# Patient Record
Sex: MALE | Race: WHITE
[De-identification: names, ages, dates, MRNs, and addresses within clinical notes are randomized per-mention and may not be internally consistent; named-entity substitution may affect disease eponyms.]

---

## 2019-03-11 ENCOUNTER — HOSPITAL ENCOUNTER (EMERGENCY)
Dept: HOSPITAL 88 - FSED | Age: 69
Discharge: HOME | End: 2019-03-11
Payer: MEDICARE

## 2019-03-11 VITALS — WEIGHT: 232 LBS | HEIGHT: 75 IN | BODY MASS INDEX: 28.85 KG/M2

## 2019-03-11 VITALS — DIASTOLIC BLOOD PRESSURE: 97 MMHG | SYSTOLIC BLOOD PRESSURE: 149 MMHG

## 2019-03-11 DIAGNOSIS — R51: ICD-10-CM

## 2019-03-11 DIAGNOSIS — R19.7: ICD-10-CM

## 2019-03-11 DIAGNOSIS — R05: Primary | ICD-10-CM

## 2019-03-11 DIAGNOSIS — K21.9: ICD-10-CM

## 2019-03-11 DIAGNOSIS — J00: ICD-10-CM

## 2019-03-11 DIAGNOSIS — R11.2: ICD-10-CM

## 2019-03-11 PROCEDURE — 99283 EMERGENCY DEPT VISIT LOW MDM: CPT

## 2019-03-11 PROCEDURE — 87400 INFLUENZA A/B EACH AG IA: CPT

## 2019-03-11 PROCEDURE — 83518 IMMUNOASSAY DIPSTICK: CPT

## 2019-03-11 NOTE — XMS REPORT
Patient Summary Document

                             Created on: 2019



DAILEY, TERRY DUANE

External Reference #: [id number]

: 1950

Sex: Male



Demographics







                          Address                   .

[place]  [address]

 

                          Home Phone                [phone]

 

                          Preferred Language        Unknown

 

                          Marital Status            Unknown

 

                          Restorationist Affiliation     Unknown

 

                          Race                      Unknown

 

                          Ethnic Group              Unknown





Author







                          Author                    [facility]

 

                          Address                   Unknown

 

                          Phone                     Unavailable







Care Team Providers







                    Care Team Member Name    Role                Phone

 

                    ASHLEY CUELLO    Unavailable         Unavailable







Problems

This patient has no known problems.



Allergies, Adverse Reactions, Alerts

This patient has no known allergies or adverse reactions.



Medications

This patient has no known medications.



Results







           Test Description    Test Time    Test Comments    Text Results    Atomic Results    Result

 Comments

 

                TISSUE EXAM     2017 13:22:00                    Surgical Pathology Report                  

       Case: [id number]                                 Authorizing Provider:  
Leigh Cuello,   Collected:           08/10/2017 0748                 
                 MD                                                             
           Ordering Location:     [facility]         Received:          
 08/10/2017 0858                                   SERVICES                     
                                             Pathologist:           Daniel Waller MD                                                   Specimen:    
Femoral Head,Left Hip                                                           
    BONE AND SOFT TISSUE, LEFT HIP, ARTHROPLASTY:      -OSTEOARTHRITIS AND 
CHRONIC PROLIFERATIVE SYNOVITIS      Signing Pathologist Direct Phone Line: 
[phone] signed by Daniel Waller MD on 2017 at  
1:22 [id number] osteoarthritis left hipLeft femoral headReceived fresh 
labeled "femoral head, left hip" is a 7.5 x 6.0 x 3.5 cm femoral head. The 
articular surface is gray-white to yellow-tan and displays focal areas of 
pitting and eburnation. Also noted is osteophyte formation surrounding the 
periphery. Section code: A1-A2, bone for decalcification; A3, soft tissue for 
decalcification. DB/pl The sections show reduplication of the tidemark with 
eburnation and osteophyte formation.  The synovial tissue reveals a mild 
proliferation of small blood vessels and subsynovial edema with chronic 
inflammation.                            

 

                BASIC METABOLIC PANEL    2017 04:30:00                      

 

   

 

                SODIUM (BEAKER) (test code=381)    133 meq/L       136-145          

 

                POTASSIUM (BEAKER) (test code=379)    4.6 meq/L       3.5-5.1          

 

                CHLORIDE (BEAKER) (test code=382)    106 meq/L                  

 

                CO2 (BEAKER) (test code=355)    17 meq/L        22-29            

 

                BLOOD UREA NITROGEN (BEAKER) (test code=354)    21 mg/dL        7-21             

 

                CREATININE (BEAKER) (test code=358)    0.87 mg/dL      0.57-1.25        

 

                GLUCOSE RANDOM (BEAKER) (test code=652)    228 mg/dL                  

 

                CALCIUM (BEAKER) (test code=697)    8.2 mg/dL       8.4-10.2         

 

                EGFR (BEAKER) (test code=1092)    88 mL/min/1.73 sq m                    ESTIMATED GFR IS NOT AS 

ACCURATE AS CREATININE CLEARANCE IN PREDICTING GLOMERULAR FILTRATION RATE. 
ESTIMATED GFR IS NOT APPLICABLE FOR DIALYSIS PATIENTS.





HEMOGLOBIN AND [id number] 04:16:00* 





                Test Item       Value           Reference Range    Comments

 

                HEMOGLOBIN (BEAKER) (test code=410)    12.8 GM/DL      13.7-17.5        

 

                HEMATOCRIT (BEAKER) (test code=411)    37.6 %          40.1-51.0        





[id number] 12:13:00* 





                Test Item       Value           Reference Range    Comments

 

                HEMOGLOBIN (BEAKER) (test code=410)    16.3 GM/DL      13.0-16.8        





PLATELET [id number] 12:13:00* 





                Test Item       Value           Reference Range    Comments

 

                PLATELET COUNT (BEAKER) (test code=756)    283 K/CU MM     150-430

## 2019-03-11 NOTE — XMS REPORT
Clinical Summary

                             Created on: 2019



Dailey, Terry DUANE

External Reference #: [id number]

: 1950

Sex: Male



Demographics







                          Address                   [address] [place] 

[place]  [address]

 

                          Home Phone                [phone]

 

                          Preferred Language        English

 

                          Marital Status            Unknown

 

                          Confucianism Affiliation     Restorationism

 

                          Race                      White

 

                          Ethnic Group              Non-





Author







                          Author                    [facility]

 

                          Organization              [facility]

 

                          Address                   Unknown

 

                          Phone                     Unavailable







Support







                Name            Relationship    Address         Phone

 

                    Samina Faith        [employer]                [address] [place]  [address]                 [phone]







Care Team Providers







                    Care Team Member Name    Role                Phone

 

                    Miguelina Martinez MD       PCP                 [phone]







Allergies







                                        Comments



                 Active Allergy     Reactions       Severity        Noted Date 

 

                                         



                     Hydromorphone       Hives               2016 

 

                                        



migraines



                     Morphine Hcl        Other (See          2016 



                                         Comments)   







Medications







                          End Date                  Status



              Medication     Sig          Dispensed     Refills      Start  



                                         Date  

 

                                                    Active



                     esomeprazole (NEXIUM) 40     Take 40 mg by       0   



                           MG capsule                mouth daily.     

 

                                                    Active



                     diphenhydrAMINE     Take 25 mg by       0   



                           (BENADRYL) 25 mg capsule     mouth every 6     



                                         (six) hours     



                                         as needed for     



                                         Itching.     

 

                                                    Active



                     cetirizine (ZYRTEC) 10 MG     Take 10 mg by       0   



                           tablet                    mouth daily.     







Active Problems







 



                           Problem                   Noted Date

 

 



                           Status post left hip replacement     08/10/2017







Social History







                                        Date



                 Tobacco Use     Types           Packs/Day       Years Used 

 

                                         



                                         Never Smoker    

 

    



                                         Smokeless Tobacco: Never   



                                         Used   









   



                 Alcohol Use     Drinks/Week     oz/Week         Comments

 

   



                                         No   









 



                           Sex Assigned at Birth     Date Recorded

 

 



                                         Not on file 









                                        Industry



                           Job Start Date            Occupation 

 

                                        Not on file



                           Not on file               Not on file 









                                        Travel End



                           Travel History            Travel Start 

 





                                         No recent travel history available.







Last Filed Vital Signs

Not on file



Plan of Treatment





Not on file



Implants







                    Device Identifier    Shelf Expiration Date    Model / Serial / Lot



                 Implanted       Type            Area            Manufactur   



                                         er   

 

                                        2022          502-03-58F /

 /

                                        [id number]



                 Shell Ramonita Hmsphr Clus H 58mm     Joints          Left: Hip       LENORA:ST   



                           502-03-58f - [id number]       [place]   



                           Implanted: Qty: 1 on 08/10/2017 by       Leigh Kulkarni MD       CS   

 

                                        2022          623-00-36F /

 /

                                        [id number]



                 Insrt Trident X3 0deg F 36mm     Joints          Left: Hip       LENORA:ST   



                           623-00-36f - [id number]       [place]   



                           Implanted: Qty: 1 on 08/10/2017 by       Leigh Kulkarni MD          

 

                                        2022          1693-4772-1 /

 /

[id number]



                 Scr Canc 6.5x25mm Ss 0473-1475-1 -     Joints          Left: Hip       [employer]ORA:ST   



                           [id number]                 [place]   



                           Implanted: Qty: 1 on 08/10/2017 by       Leigh Kulkarni MD          

 

                                        2022          4776-6162-1 /

 /

[id number]



                 Scr Canc 6.5x25mm Ss 3894-6294-1 -     Joints          Left: Hip       [employer]ORA:ST   



                           [id number]                 [place]   



                           Implanted: Qty: 1 on 08/10/2017 by       Leigh Kulkarni MD          

 

                                        10/23/2021          0025-1835 /

 /

                                        [id number]



                 Hip Stem Size 7 37mm X 114mm     Joints          Left: Hip       [employer]ORA:ST   



                           8100-6487 - [id number]       [place]   



                           Implanted: Qty: 1 on 08/10/2017 by       Leigh Kulkarni MD          

 

                                        2021          6570-0-236 /

 /

                                        [id number]



                 Head Fem Biolox V40 36mm 6570-0-236     Joints          Left: Hip       LENORA:ST   



                           - [id number]               [place]   



                           Implanted: Qty: 1 on 08/10/2017 by       Leigh Kulkarni MD          







Results

Not on fileafter 03/10/2018



Insurance







     



            Payer      Benefit     Subscriber ID     Type       Phone      Address



                                         Plan /    



                                         Group    

 

     



                 MEDICARE        MEDICARE A      xxxxxxxxxx      Medicare  



                                         B    

 

     



                 MCR SUPPLEMENT/INDIVIDUAL     [employer]      xxxxxxxxxxx     [employer]  



                                         D    



                                         HEALTHCARE    









     



            Guarantor Name     Account     Relation to     Date of     Phone      Billing Address



                     Type                Patient             Birth  

 

     



            Dailey,Terry DUANE     Personal/F     Self       1950     [phone]     [address]

 [address]               ([place])              [place] [address]







Advance Directives





For more information, please contact:



[address] [address] [phone]









                          Date Inactivated          Comments



                           Code Status               Date Activated  

 

                          2017  5:24 PM         



                           Full Code                 8/10/2017 11:38 AM  









  



                           This code status was determined by:     Patient